# Patient Record
Sex: MALE | Race: WHITE
[De-identification: names, ages, dates, MRNs, and addresses within clinical notes are randomized per-mention and may not be internally consistent; named-entity substitution may affect disease eponyms.]

---

## 2018-04-16 ENCOUNTER — HOSPITAL ENCOUNTER (EMERGENCY)
Dept: HOSPITAL 97 - ER | Age: 48
Discharge: HOME | End: 2018-04-16
Payer: COMMERCIAL

## 2018-04-16 DIAGNOSIS — S60.511A: ICD-10-CM

## 2018-04-16 DIAGNOSIS — Z23: ICD-10-CM

## 2018-04-16 DIAGNOSIS — S00.93XA: ICD-10-CM
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
## 2018-04-16 DIAGNOSIS — S62.251A: Primary | ICD-10-CM
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
## 2018-04-16 PROCEDURE — 96372 THER/PROPH/DIAG INJ SC/IM: CPT

## 2018-04-16 PROCEDURE — 2W3GX1Z IMMOBILIZATION OF RIGHT THUMB USING SPLINT: ICD-10-PCS

## 2018-04-16 PROCEDURE — 90714 TD VACC NO PRESV 7 YRS+ IM: CPT

## 2018-04-16 PROCEDURE — 99284 EMERGENCY DEPT VISIT MOD MDM: CPT

## 2018-04-16 NOTE — RAD REPORT
EXAM DESCRIPTION:  RAD - Hand Right 3 View - 4/16/2018 1:15 pm

 

CLINICAL HISTORY:  Trip and fall, hand pain

 

COMPARISON:  None.

 

FINDINGS:  Transverse fracture is present at the proximal shaft first metacarpal bone. Minimal angula
tion deformity with no overlap or significant distraction. No pathologic component. Phalanges of the 
first digit show no acute finding. Carpal bones are unremarkable. The second- fifth phalanges and met
acarpals without acute finding. There is remodeling from old fifth metacarpal fracture. There is no d
islocation or periosteal reaction noted.  No foreign body or other soft tissue abnormality.

 

IMPRESSION:  Fracture of the first metacarpal bone with minimal angulation deformity.

## 2018-04-16 NOTE — ER
Nurse's Notes                                                                                     

 [facility]                                                                

Name: Jesus Tapia                                                                                  

Age: 47 yrs                                                                                       

Sex: Male                                                                                         

: 1970                                                                                   

MRN: [id number]                                                                                   

Arrival Date: 2018                                                                          

Time: 11:11                                                                                       

Account#: [id number]                                                                            

Bed 10                                                                                            

Private MD:                                                                                       

Diagnosis: Displaced fracture of neck of first metacarpal bone, right hand;Contusion of           

  unspecified part of head;Abrasion of hand                                                       

                                                                                                  

Presentation:                                                                                     

                                                                                             

11:27 Presenting complaint: Patient states: "I jammed up my hand (right) really bad. I        lk1 

      tripped down some stairs and fell on it 2 days ago.". Transition of care: patient was       

      not received from another setting of care. Initial Sepsis Screen: Does the patient meet     

      any 2 criteria? No. Patient's initial sepsis screen is negative. Does the patient have      

      a suspected source of infection? No. Patient initial sepsis screen negative. Onset of       

      symptoms was 2018. Care prior to arrival: None.                                   

11:27 Method Of Arrival: Ambulatory                                                           lk1 

11:27 Acuity: MIL 4                                                                           lk1 

                                                                                                  

Triage Assessment:                                                                                

11:28 General: Appears in no apparent distress. Behavior is calm, cooperative, appropriate    lk1 

      for age. Pain: Complains of pain in right hand Pain currently is 9 out of 10 on a pain      

      scale. Musculoskeletal: Circulation, motion, and sensation intact. Swelling present in      

      right hand. Injury Description: Bruise.                                                     

                                                                                                  

Historical:                                                                                       

- Allergies:                                                                                      

11:28 No Known Allergies;                                                                     lk1 

- PMHx:                                                                                           

11:28 None;                                                                                   lk1 

- PSHx:                                                                                           

11:28 facial surgery;                                                                         lk1 

                                                                                                  

- Immunization history:: Adult Immunizations up to date.                                          

- Social history:: Smoking status: Patient/guardian denies using tobacco.                         

                                                                                                  

                                                                                                  

Screenin:37 Abuse screen: Denies threats or abuse. Nutritional screening: No deficits noted.        la1 

      Tuberculosis screening: No symptoms or risk factors identified. Fall Risk None              

      identified.                                                                                 

                                                                                                  

Assessment:                                                                                       

14:38 General: Appears in no apparent distress. Behavior is calm, cooperative. Pain:          la1 

      Complains of pain in heel of right hand and right hand. Neuro: No deficits noted.           

      Cardiovascular: Capillary refill < 3 seconds. Respiratory: Airway is patent Respiratory     

      effort is even, unlabored. GI: No signs and/or symptoms were reported involving the         

      gastrointestinal system. : No signs and/or symptoms were reported regarding the           

      genitourinary system. Musculoskeletal: Circulation, motion, and sensation intact.           

      Capillary refill < 3 seconds, is brisk, in bilateral fingers.                               

                                                                                                  

Vital Signs:                                                                                      

11:29  / 98; Pulse 84; Resp 15; Temp 98.3(TE); Pulse Ox 98% on R/A; Weight 72.57 kg     lk1 

      (R); Height 5 ft. 8 in. (172.72 cm) (R); Pain 9/10;                                         

11:29 Body Mass Index 24.33 (72.57 kg, 172.72 cm)                                             lk1 

                                                                                                  

ED Course:                                                                                        

11:11 Patient arrived in ED.                                                                  as  

11:28 Triage completed.                                                                       lk1 

11:31 Arm band placed on left wrist.                                                          lk1 

11:47 Pat Mane, RN is Primary Nurse.                                                   iw  

11:48 Lupis Orr FNP-C is PHCP.                                                        snw 

11:48 Jorge Rose MD is Attending Physician.                                                snw 

13:14 X-ray completed. Portable x-ray completed in exam room.                                 jr1 

13:15 Hand Right 3 View XRAY In Process Unspecified.                                          EDMS

14:12 Orthoglass splint: Thumb spica splint applied on right forearm. capillary refill less   dh3 

      than 3 seconds.                                                                             

14:29 Paco Carson MD is Referral Physician.                                              snw 

14:37 Call light in reach.                                                                    la1 

14:37 No provider procedures requiring assistance completed. Patient did not have IV access   la1 

      during this emergency room visit.                                                           

                                                                                                  

Administered Medications:                                                                         

14:00 Drug: Tetanus-Diphtheria Toxoid Adult 0.5 ml {: [employer] [place]. Exp:           

      05/10/2020. Lot #: [id number]. } Route: IM; Site: left deltoid;                                  

14:37 Follow up: Response: No adverse reaction                                                la1 

14:00 Drug: TORadol 60 mg Route: IM; Site: right gluteus;                                     iw  

14:37 Follow up: Response: No adverse reaction; Pain is decreased                             la1 

                                                                                                  

                                                                                                  

Outcome:                                                                                          

14:30 Discharge ordered by MD.                                                                snw 

14:38 Discharged to home ambulatory.                                                          la1 

14:38 Condition: stable                                                                           

14:38 Discharge instructions given to patient, Instructed on discharge instructions, follow       

      up and referral plans. medication usage, Demonstrated understanding of instructions,        

      follow-up care, medications, Prescriptions given X 1.                                       

14:38 Patient left the ED.                                                                    la1 

                                                                                                  

Signatures:                                                                                       

Dispatcher [employer]                           EDMS                                                 

Dominga, Lupis, FNP-C                 FNP-Csnw                                                  

Magda Pritchard jr1                                                  

Violeta Green Irene, RN                     RN   Bert Osborne, RN                         RN   la1                                                  

Claritza Carvajal, JUANCARLOS                         RN   lk1                                                  

Lev, Renetta                              3                                                  

                                                                                                  

**************************************************************************************************
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
